# Patient Record
Sex: FEMALE | Race: BLACK OR AFRICAN AMERICAN | Employment: FULL TIME | URBAN - METROPOLITAN AREA
[De-identification: names, ages, dates, MRNs, and addresses within clinical notes are randomized per-mention and may not be internally consistent; named-entity substitution may affect disease eponyms.]

---

## 2022-06-30 ENCOUNTER — HOSPITAL ENCOUNTER (OUTPATIENT)
Dept: LAB | Age: 47
Discharge: HOME | End: 2022-06-30
Payer: COMMERCIAL

## 2022-06-30 LAB
FAX TO INFO,FAXT: NORMAL
FAX TO NUMBER,FAXN: NORMAL
HCT VFR BLD AUTO: 34.4 % (ref 35–45)
HGB BLD-MCNC: 11 G/DL (ref 12–16)

## 2022-06-30 PROCEDURE — 36415 COLL VENOUS BLD VENIPUNCTURE: CPT

## 2022-06-30 PROCEDURE — 85018 HEMOGLOBIN: CPT

## 2022-07-08 ENCOUNTER — HOSPITAL ENCOUNTER (OUTPATIENT)
Dept: LAB | Age: 47
Discharge: HOME | End: 2022-07-08
Payer: COMMERCIAL

## 2022-07-08 PROCEDURE — 88304 TISSUE EXAM BY PATHOLOGIST: CPT

## 2022-07-15 ENCOUNTER — TRANSCRIBE ORDER (OUTPATIENT)
Dept: SCHEDULING | Age: 47
End: 2022-07-15

## 2022-07-15 DIAGNOSIS — R10.84 GENERALIZED ABDOMINAL PAIN: Primary | ICD-10-CM

## 2022-07-19 ENCOUNTER — HOSPITAL ENCOUNTER (OUTPATIENT)
Dept: CT IMAGING | Age: 47
Discharge: HOME | End: 2022-07-19
Payer: COMMERCIAL

## 2022-07-19 DIAGNOSIS — R10.84 GENERALIZED ABDOMINAL PAIN: ICD-10-CM

## 2022-07-19 PROCEDURE — 74176 CT ABD & PELVIS W/O CONTRAST: CPT

## 2022-08-03 ENCOUNTER — HOSPITAL ENCOUNTER (EMERGENCY)
Age: 47
Discharge: HOME | End: 2022-08-03
Attending: STUDENT IN AN ORGANIZED HEALTH CARE EDUCATION/TRAINING PROGRAM
Payer: COMMERCIAL

## 2022-08-03 ENCOUNTER — APPOINTMENT (OUTPATIENT)
Dept: CT IMAGING | Age: 47
End: 2022-08-03
Attending: PHYSICIAN ASSISTANT
Payer: COMMERCIAL

## 2022-08-03 VITALS
OXYGEN SATURATION: 97 % | HEART RATE: 97 BPM | SYSTOLIC BLOOD PRESSURE: 128 MMHG | BODY MASS INDEX: 29.99 KG/M2 | WEIGHT: 180 LBS | TEMPERATURE: 97.6 F | RESPIRATION RATE: 16 BRPM | HEIGHT: 65 IN | DIASTOLIC BLOOD PRESSURE: 78 MMHG

## 2022-08-03 DIAGNOSIS — K59.00 CONSTIPATION, UNSPECIFIED CONSTIPATION TYPE: Primary | ICD-10-CM

## 2022-08-03 DIAGNOSIS — G89.18 POSTOPERATIVE PAIN: ICD-10-CM

## 2022-08-03 LAB
ALBUMIN SERPL-MCNC: 3.5 G/DL (ref 3.4–5)
ALBUMIN/GLOB SERPL: 1.1 {RATIO} (ref 0.8–1.7)
ALP SERPL-CCNC: 62 U/L (ref 45–117)
ALT SERPL-CCNC: 18 U/L (ref 13–56)
ANION GAP SERPL CALC-SCNC: 5 MMOL/L (ref 3–18)
APPEARANCE UR: CLEAR
AST SERPL-CCNC: 15 U/L (ref 10–38)
BASOPHILS # BLD: 0 K/UL (ref 0–0.1)
BASOPHILS NFR BLD: 1 % (ref 0–2)
BILIRUB SERPL-MCNC: 0.3 MG/DL (ref 0.2–1)
BILIRUB UR QL: NEGATIVE
BUN SERPL-MCNC: 9 MG/DL (ref 7–18)
BUN/CREAT SERPL: 10 (ref 12–20)
CALCIUM SERPL-MCNC: 9.2 MG/DL (ref 8.5–10.1)
CHLORIDE SERPL-SCNC: 109 MMOL/L (ref 100–111)
CO2 SERPL-SCNC: 29 MMOL/L (ref 21–32)
COLOR UR: YELLOW
CREAT SERPL-MCNC: 0.9 MG/DL (ref 0.6–1.3)
DIFFERENTIAL METHOD BLD: ABNORMAL
EOSINOPHIL # BLD: 0.1 K/UL (ref 0–0.4)
EOSINOPHIL NFR BLD: 2 % (ref 0–5)
ERYTHROCYTE [DISTWIDTH] IN BLOOD BY AUTOMATED COUNT: 12.4 % (ref 11.6–14.5)
GLOBULIN SER CALC-MCNC: 3.1 G/DL (ref 2–4)
GLUCOSE SERPL-MCNC: 119 MG/DL (ref 74–99)
GLUCOSE UR STRIP.AUTO-MCNC: NEGATIVE MG/DL
HCT VFR BLD AUTO: 30.4 % (ref 35–45)
HGB BLD-MCNC: 10 G/DL (ref 12–16)
HGB UR QL STRIP: NEGATIVE
IMM GRANULOCYTES # BLD AUTO: 0 K/UL (ref 0–0.04)
IMM GRANULOCYTES NFR BLD AUTO: 0 % (ref 0–0.5)
KETONES UR QL STRIP.AUTO: NEGATIVE MG/DL
LEUKOCYTE ESTERASE UR QL STRIP.AUTO: NEGATIVE
LIPASE SERPL-CCNC: 125 U/L (ref 73–393)
LYMPHOCYTES # BLD: 1.4 K/UL (ref 0.9–3.6)
LYMPHOCYTES NFR BLD: 26 % (ref 21–52)
MCH RBC QN AUTO: 30.6 PG (ref 24–34)
MCHC RBC AUTO-ENTMCNC: 32.9 G/DL (ref 31–37)
MCV RBC AUTO: 93 FL (ref 78–100)
MONOCYTES # BLD: 0.3 K/UL (ref 0.05–1.2)
MONOCYTES NFR BLD: 6 % (ref 3–10)
NEUTS SEG # BLD: 3.6 K/UL (ref 1.8–8)
NEUTS SEG NFR BLD: 67 % (ref 40–73)
NITRITE UR QL STRIP.AUTO: NEGATIVE
NRBC # BLD: 0 K/UL (ref 0–0.01)
NRBC BLD-RTO: 0 PER 100 WBC
PH UR STRIP: 5.5 [PH] (ref 5–8)
PLATELET # BLD AUTO: 321 K/UL (ref 135–420)
PMV BLD AUTO: 10.5 FL (ref 9.2–11.8)
POTASSIUM SERPL-SCNC: 4.8 MMOL/L (ref 3.5–5.5)
PROT SERPL-MCNC: 6.6 G/DL (ref 6.4–8.2)
PROT UR STRIP-MCNC: NEGATIVE MG/DL
RBC # BLD AUTO: 3.27 M/UL (ref 4.2–5.3)
SODIUM SERPL-SCNC: 143 MMOL/L (ref 136–145)
SP GR UR REFRACTOMETRY: 1.02 (ref 1–1.03)
UROBILINOGEN UR QL STRIP.AUTO: 0.2 EU/DL (ref 0.2–1)
WBC # BLD AUTO: 5.5 K/UL (ref 4.6–13.2)

## 2022-08-03 PROCEDURE — 74011250636 HC RX REV CODE- 250/636: Performed by: PHYSICIAN ASSISTANT

## 2022-08-03 PROCEDURE — 96374 THER/PROPH/DIAG INJ IV PUSH: CPT

## 2022-08-03 PROCEDURE — 81003 URINALYSIS AUTO W/O SCOPE: CPT

## 2022-08-03 PROCEDURE — 87086 URINE CULTURE/COLONY COUNT: CPT

## 2022-08-03 PROCEDURE — 85025 COMPLETE CBC W/AUTO DIFF WBC: CPT

## 2022-08-03 PROCEDURE — 80053 COMPREHEN METABOLIC PANEL: CPT

## 2022-08-03 PROCEDURE — 99284 EMERGENCY DEPT VISIT MOD MDM: CPT

## 2022-08-03 PROCEDURE — 74176 CT ABD & PELVIS W/O CONTRAST: CPT

## 2022-08-03 PROCEDURE — 83690 ASSAY OF LIPASE: CPT

## 2022-08-03 RX ORDER — MORPHINE SULFATE 4 MG/ML
4 INJECTION INTRAVENOUS ONCE
Status: COMPLETED | OUTPATIENT
Start: 2022-08-03 | End: 2022-08-03

## 2022-08-03 RX ADMIN — SODIUM CHLORIDE 1000 ML: 9 INJECTION, SOLUTION INTRAVENOUS at 11:28

## 2022-08-03 RX ADMIN — MORPHINE SULFATE 4 MG: 4 INJECTION INTRAVENOUS at 11:29

## 2022-08-03 NOTE — ED PROVIDER NOTES
EMERGENCY DEPARTMENT HISTORY & PHYSICAL EXAM    THE [facility] EMERGENCY DEPT  8/3/2022, 11:11 AM    Clinical Impression:  1. Constipation, unspecified constipation type    2. Postoperative pain        Assessment/Differential Diagnosis:     Ddx postsurgical complication, constipation, acute intra-abdominal pathology, infection, UTI, urinary retention all considered    ED Course:   Initial assessment performed. The patients presenting problems have been discussed, and they are in agreement with the care plan formulated and outlined with them. I have encouraged them to ask questions as they arise throughout their visit. Patient with hemorrhoidectomy done by Dr. Jacey Crockett on 12 July. Since that time she has had considerable abdominal pain in problems with constipation. She is in the ER today because she feels she is having burning with urination that started about 12 hours ago. No fever, trauma. Exam with considerable tenderness below the umbilicus both sides with no point tenderness. Vitals are within normal limits. Bladder scan with little urine after urination. Rectal exam with what appears to be well-healing area, tenderness throughout with no abscess appreciated. Skin appears healthy. Discussed case with Dr. Charlie Perez, on-call for Dr. Jacey Crockett, who agrees with blood work, urinalysis and CT scan without contrast given IV contrast shortage. Workup with CT with large stool burden, no other acute concerns  Discussed plan to d/c, need for several large bowel movements, discussed options, will try increasing miralax, and golytely  Pt to call Dr. Jacey Crockett tomorrow  Return to ED if new/worsening symptoms or new concerns       Medical Chart Review:  I have reviewed triage nursing documentation. Review of old medical records with the following pertinent information:       Disposition:  Home  in good condition.       Chief Complaint   Patient presents with    Urinary Pain    Constipation HPI:    The history is provided by patient. No  used. Jessica Blank is a 49 y.o. female presenting to the Emergency Department with complaints of dysuria. Patient had hemorrhoidectomy on 12 July with Dr. Sergei Duran. She states she has had considerable abdominal pain since then. She did have a CT on the 19th with no acute findings other than significant stool burden. She has been using MiraLAX. Sobeida Marlene She states she is having a small amount of stool daily. She states she is having significant pain with bowel movement but that has not changed since surgery. She has noticed over the last 12 to 24 hours that she has pain with urination which is new. She feels she is not emptying her bladder completely. For that reason she comes to the ER for evaluation. There is been no fever, chills or flulike illness. No shortness of breath, chest pain. Slight nausea but no vomiting. She states she is eating and drinking well. No other concerns      I have reviewed all PMHX, FMHX and Social Hx as entered into the medical record in the chart below using the Epic Template. Review of Systems:  Constitutional:  Neg for fever,  chills, weight changes. ENT:  Neg for Sore throat, runny nose, or other URI symptoms  Respiratory:  neg for cough, no shortness of breath, or wheezing  Cardiovascular:  No chest pain, chest pressure, palpitations. GI:  no vomiting, nodiarrhea, + abdominal pain. : + dysuria, no frequency, +urgency. No Flank pain. MSK no joint pain, no myalgias  Integumentary: no rashes, lesions, or skin irritation. Neurological: no headaches, dizziness  All other systems reviewed negative except was is positive in ROS and HPI.     Past Medical History:  Past Medical History:   Diagnosis Date    Anemia     Pelvic mass        Past Surgical History:  Past Surgical History:   Procedure Laterality Date    HX HEMORRHOIDECTOMY      HX HYSTERECTOMY  01/01/2011       Family History:  History reviewed. No pertinent family history. Social History:  Social History     Tobacco Use    Smoking status: Never   Substance Use Topics    Alcohol use: No    Drug use: Never       Allergies: Allergies   Allergen Reactions    Adhesive Rash     Electrodes causes redness, burn appearence    Nyquil [Doxylamin-Pse-Dm-Acetaminophen] Palpitations    Vicodin [Hydrocodone-Acetaminophen] Other (comments)     hallucinations       Vital Signs:  Vitals:    08/03/22 1028   BP: 128/78   Pulse: 97   Resp: 16   Temp: 97.6 °F (36.4 °C)   SpO2: 97%   Weight: 81.6 kg (180 lb)   Height: 5' 5\" (1.651 m)     Physical Exam:  Vital Signs Reviewed. Nursing Notes Reviewed. Constitutional:  Well developed, well nourished patient. Appearance and behavior are age and situation appropriate. Head: Normocephalic, Atraumatic  Eyes: Conjunctiva clear, lids normal. Sclera anicteric. PERRL.  ENT:  gross hearing normal  Neck:  Supple, FROM. Trachea midline. No nuchal rigidity. Lungs: Lungs CTAB. No wheezes, rales or rhonchi. No respiratory distress, tachypnea or accessory muscle use. No cough. Cardiac:  RRR without murmur. No peripheral edema  Abdomen: soft, +tender LLQ and RLQ, no rebound, or guarding, normal  bowel sounds, no mass. Skin without rash or signs of trauma. Rectal: pain with external examination,  buttock. No obvious cellulitis, or abscess. Extremities:  no pedal edema  Neuro:  A&O , no obvious neuro deficit with general inspection.   Skin:  Warm, dry, no rash  Back:  No CVAT    Diagnostics:    Labs -     Recent Results (from the past 12 hour(s))   CBC WITH AUTOMATED DIFF    Collection Time: 08/03/22 11:15 AM   Result Value Ref Range    WBC 5.5 4.6 - 13.2 K/uL    RBC 3.27 (L) 4.20 - 5.30 M/uL    HGB 10.0 (L) 12.0 - 16.0 g/dL    HCT 30.4 (L) 35.0 - 45.0 %    MCV 93.0 78.0 - 100.0 FL    MCH 30.6 24.0 - 34.0 PG    MCHC 32.9 31.0 - 37.0 g/dL    RDW 12.4 11.6 - 14.5 %    PLATELET 252 706 - 050 K/uL    MPV 10.5 9.2 - 11.8 FL NRBC 0.0 0  WBC    ABSOLUTE NRBC 0.00 0.00 - 0.01 K/uL    NEUTROPHILS 67 40 - 73 %    LYMPHOCYTES 26 21 - 52 %    MONOCYTES 6 3 - 10 %    EOSINOPHILS 2 0 - 5 %    BASOPHILS 1 0 - 2 %    IMMATURE GRANULOCYTES 0 0.0 - 0.5 %    ABS. NEUTROPHILS 3.6 1.8 - 8.0 K/UL    ABS. LYMPHOCYTES 1.4 0.9 - 3.6 K/UL    ABS. MONOCYTES 0.3 0.05 - 1.2 K/UL    ABS. EOSINOPHILS 0.1 0.0 - 0.4 K/UL    ABS. BASOPHILS 0.0 0.0 - 0.1 K/UL    ABS. IMM. GRANS. 0.0 0.00 - 0.04 K/UL    DF AUTOMATED     METABOLIC PANEL, COMPREHENSIVE    Collection Time: 08/03/22 11:15 AM   Result Value Ref Range    Sodium 143 136 - 145 mmol/L    Potassium 4.8 3.5 - 5.5 mmol/L    Chloride 109 100 - 111 mmol/L    CO2 29 21 - 32 mmol/L    Anion gap 5 3.0 - 18 mmol/L    Glucose 119 (H) 74 - 99 mg/dL    BUN 9 7.0 - 18 MG/DL    Creatinine 0.90 0.6 - 1.3 MG/DL    BUN/Creatinine ratio 10 (L) 12 - 20      GFR est AA >60 >60 ml/min/1.73m2    GFR est non-AA >60 >60 ml/min/1.73m2    Calcium 9.2 8.5 - 10.1 MG/DL    Bilirubin, total 0.3 0.2 - 1.0 MG/DL    ALT (SGPT) 18 13 - 56 U/L    AST (SGOT) 15 10 - 38 U/L    Alk. phosphatase 62 45 - 117 U/L    Protein, total 6.6 6.4 - 8.2 g/dL    Albumin 3.5 3.4 - 5.0 g/dL    Globulin 3.1 2.0 - 4.0 g/dL    A-G Ratio 1.1 0.8 - 1.7     LIPASE    Collection Time: 08/03/22 11:15 AM   Result Value Ref Range    Lipase 125 73 - 393 U/L   URINALYSIS W/ RFLX MICROSCOPIC    Collection Time: 08/03/22 11:15 AM   Result Value Ref Range    Color YELLOW      Appearance CLEAR      Specific gravity 1.017 1.005 - 1.030      pH (UA) 5.5 5.0 - 8.0      Protein Negative NEG mg/dL    Glucose Negative NEG mg/dL    Ketone Negative NEG mg/dL    Bilirubin Negative NEG      Blood Negative NEG      Urobilinogen 0.2 0.2 - 1.0 EU/dL    Nitrites Negative NEG      Leukocyte Esterase Negative NEG         Radiologic Studies -   CT ABD PELV WO CONT   Final Result      No acute intra-abdominal abnormality. Large amount stool throughout the colon.            CT Results  (Last 48 hours)                 08/03/22 1250  CT ABD PELV WO CONT Final result    Impression:      No acute intra-abdominal abnormality. Large amount stool throughout the colon. Narrative:  EXAM: CT of the abdomen and pelvis       INDICATION: Pain. COMPARISON: 7/19/2022       TECHNIQUE: Axial CT imaging of the abdomen and pelvis was performed without   intravenous contrast. Multiplanar reformats were generated. One or more dose reduction techniques were used on this CT: automated exposure   control, adjustment of the mAs and/or kVp according to patient size, and   iterative reconstruction techniques. The specific techniques used on this CT   exam have been documented in the patient's electronic medical record. Digital   Imaging and Communications in Medicine (DICOM) format image data are available   to nonaffiliated external healthcare facilities or entities on a secure, media   free, reciprocally searchable basis with patient authorization for at least a   12-month period after this study. _______________       FINDINGS:       LOWER CHEST: Unremarkable. LIVER, BILIARY: Liver is normal. No biliary dilation. Gallbladder is   unremarkable. PANCREAS: Normal.       SPLEEN: Normal.       ADRENALS: Normal.       KIDNEYS/URETERS/BLADDER: No hydronephrosis or renal calcification. PELVIC ORGANS: Prior AMOS/BSO.       VASCULATURE: Unremarkable       LYMPH NODES: No enlarged lymph nodes. GASTROINTESTINAL TRACT: No bowel dilation or wall thickening. Rectal sigmoid   anastomotic suture with adjacent postsurgical changes. Large amount stool   throughout the colon. BONES: No acute or aggressive osseous abnormalities identified.        OTHER: None.       _______________                 CXR Results  (Last 48 hours)      None            Medications given in the ED-  Medications   morphine injection 4 mg (4 mg IntraVENous Given 8/3/22 7593)   sodium chloride 0.9 % bolus infusion 1,000 mL (1,000 mL IntraVENous New Bag 8/3/22 6348)       Please note that this dictation was completed with [employer], the computer voice recognition software. Quite often unanticipated grammatical, syntax, homophones, and other interpretive errors are inadvertently transcribed by the computer software. Please disregard these errors. Please excuse any errors that have escaped final proofreading.

## 2022-08-03 NOTE — DISCHARGE INSTRUCTIONS
Well-hydrated  Healthy diet  Continue all your home medications  Avoid Percocet as it will worsen your constipation  MiraLAX, take 1 capful 3 times a day  May add stool softener  Can try Dulcolax  GoLytely as prescribed  Your CT shows a large amount of stool. He will need to have several large bowel movements for clearance.   Follow-up with Dr. Virgen Goncalves via phone call tomorrow  Return to ER if any new or worsening symptoms or new concerns

## 2022-08-03 NOTE — ED TRIAGE NOTES
Patient reports she had hemorroidectomy about 4 weeks ago now she is having trouble urinating and constipated ( last bm was yesterday)

## 2022-08-04 LAB
BACTERIA SPEC CULT: NORMAL
SERVICE CMNT-IMP: NORMAL

## 2022-08-05 ENCOUNTER — HOSPITAL ENCOUNTER (EMERGENCY)
Age: 47
Discharge: HOME | End: 2022-08-05
Attending: STUDENT IN AN ORGANIZED HEALTH CARE EDUCATION/TRAINING PROGRAM
Payer: COMMERCIAL

## 2022-08-05 VITALS
SYSTOLIC BLOOD PRESSURE: 127 MMHG | RESPIRATION RATE: 18 BRPM | HEART RATE: 86 BPM | OXYGEN SATURATION: 100 % | TEMPERATURE: 98 F | BODY MASS INDEX: 29.99 KG/M2 | DIASTOLIC BLOOD PRESSURE: 76 MMHG | WEIGHT: 180 LBS | HEIGHT: 65 IN

## 2022-08-05 DIAGNOSIS — Z98.890 HX OF HEMORRHOIDECTOMY: ICD-10-CM

## 2022-08-05 DIAGNOSIS — K62.89 RECTAL PAIN: Primary | ICD-10-CM

## 2022-08-05 LAB
ALBUMIN SERPL-MCNC: 3.4 G/DL (ref 3.4–5)
ALBUMIN/GLOB SERPL: 1.1 {RATIO} (ref 0.8–1.7)
ALP SERPL-CCNC: 53 U/L (ref 45–117)
ALT SERPL-CCNC: 19 U/L (ref 13–56)
ANION GAP SERPL CALC-SCNC: 4 MMOL/L (ref 3–18)
AST SERPL-CCNC: 14 U/L (ref 10–38)
BASOPHILS # BLD: 0 K/UL (ref 0–0.1)
BASOPHILS NFR BLD: 1 % (ref 0–2)
BILIRUB SERPL-MCNC: 0.5 MG/DL (ref 0.2–1)
BUN SERPL-MCNC: 6 MG/DL (ref 7–18)
BUN/CREAT SERPL: 7 (ref 12–20)
CALCIUM SERPL-MCNC: 9 MG/DL (ref 8.5–10.1)
CHLORIDE SERPL-SCNC: 107 MMOL/L (ref 100–111)
CO2 SERPL-SCNC: 30 MMOL/L (ref 21–32)
CREAT SERPL-MCNC: 0.84 MG/DL (ref 0.6–1.3)
DIFFERENTIAL METHOD BLD: ABNORMAL
EOSINOPHIL # BLD: 0.1 K/UL (ref 0–0.4)
EOSINOPHIL NFR BLD: 2 % (ref 0–5)
ERYTHROCYTE [DISTWIDTH] IN BLOOD BY AUTOMATED COUNT: 12.5 % (ref 11.6–14.5)
GLOBULIN SER CALC-MCNC: 3.1 G/DL (ref 2–4)
GLUCOSE SERPL-MCNC: 89 MG/DL (ref 74–99)
HCT VFR BLD AUTO: 28.3 % (ref 35–45)
HGB BLD-MCNC: 9.3 G/DL (ref 12–16)
IMM GRANULOCYTES # BLD AUTO: 0 K/UL (ref 0–0.04)
IMM GRANULOCYTES NFR BLD AUTO: 1 % (ref 0–0.5)
INR PPP: 1 (ref 0.8–1.2)
LYMPHOCYTES # BLD: 1.3 K/UL (ref 0.9–3.6)
LYMPHOCYTES NFR BLD: 30 % (ref 21–52)
MCH RBC QN AUTO: 30.3 PG (ref 24–34)
MCHC RBC AUTO-ENTMCNC: 32.9 G/DL (ref 31–37)
MCV RBC AUTO: 92.2 FL (ref 78–100)
MONOCYTES # BLD: 0.3 K/UL (ref 0.05–1.2)
MONOCYTES NFR BLD: 8 % (ref 3–10)
NEUTS SEG # BLD: 2.6 K/UL (ref 1.8–8)
NEUTS SEG NFR BLD: 60 % (ref 40–73)
NRBC # BLD: 0 K/UL (ref 0–0.01)
NRBC BLD-RTO: 0 PER 100 WBC
PLATELET # BLD AUTO: 316 K/UL (ref 135–420)
PMV BLD AUTO: 10.6 FL (ref 9.2–11.8)
POTASSIUM SERPL-SCNC: 3.7 MMOL/L (ref 3.5–5.5)
PROT SERPL-MCNC: 6.5 G/DL (ref 6.4–8.2)
PROTHROMBIN TIME: 13.4 SEC (ref 11.5–15.2)
RBC # BLD AUTO: 3.07 M/UL (ref 4.2–5.3)
SODIUM SERPL-SCNC: 141 MMOL/L (ref 136–145)
WBC # BLD AUTO: 4.3 K/UL (ref 4.6–13.2)

## 2022-08-05 PROCEDURE — 74011250636 HC RX REV CODE- 250/636: Performed by: STUDENT IN AN ORGANIZED HEALTH CARE EDUCATION/TRAINING PROGRAM

## 2022-08-05 PROCEDURE — 99284 EMERGENCY DEPT VISIT MOD MDM: CPT

## 2022-08-05 PROCEDURE — 80053 COMPREHEN METABOLIC PANEL: CPT

## 2022-08-05 PROCEDURE — 96375 TX/PRO/DX INJ NEW DRUG ADDON: CPT

## 2022-08-05 PROCEDURE — 85025 COMPLETE CBC W/AUTO DIFF WBC: CPT

## 2022-08-05 PROCEDURE — 85610 PROTHROMBIN TIME: CPT

## 2022-08-05 PROCEDURE — 96374 THER/PROPH/DIAG INJ IV PUSH: CPT

## 2022-08-05 RX ORDER — DIAZEPAM 5 MG/1
5 TABLET ORAL
Qty: 12 TABLET | Refills: 0 | Status: SHIPPED | OUTPATIENT
Start: 2022-08-05

## 2022-08-05 RX ORDER — MORPHINE SULFATE 4 MG/ML
4 INJECTION INTRAVENOUS ONCE
Status: COMPLETED | OUTPATIENT
Start: 2022-08-05 | End: 2022-08-05

## 2022-08-05 RX ORDER — ONDANSETRON 2 MG/ML
4 INJECTION INTRAMUSCULAR; INTRAVENOUS ONCE
Status: COMPLETED | OUTPATIENT
Start: 2022-08-05 | End: 2022-08-05

## 2022-08-05 RX ADMIN — SODIUM CHLORIDE 1000 ML: 9 INJECTION, SOLUTION INTRAVENOUS at 07:30

## 2022-08-05 RX ADMIN — ONDANSETRON 4 MG: 2 INJECTION INTRAMUSCULAR; INTRAVENOUS at 07:30

## 2022-08-05 RX ADMIN — MORPHINE SULFATE 4 MG: 4 INJECTION INTRAVENOUS at 07:30

## 2022-08-05 NOTE — DISCHARGE INSTRUCTIONS
Go to pharmacy to  your Valium, while you are there you should buy MiraLAX which you should need to take 3 times daily as well as bisacodyl to help manage your stools until you see Dr. Earney Severs. Use the Valium as needed for your pain, you can continue to take Motrin and Tylenol    Return to the emergency department as needed.

## 2022-08-05 NOTE — ED TRIAGE NOTES
Pt arrived with c/o continued postoperative pain and bleeding. Pt had a hemorrhoidectomy 4 weeks ago. Pt was seen here for abdominal and rectal pain 2 days ago and was found to be constipated. Pt has noted increase of blood in her stool and issues with her suture. Pt is squirming and in visible pain. Pt is alert and oriented x4. Vital signs are stable.

## 2022-08-05 NOTE — ED PROVIDER NOTES
EMERGENCY DEPARTMENT HISTORY AND PHYSICAL EXAM    7:13 AM    Date: 8/5/2022  Patient Name: Luis Coates    History of Presenting Illness     Chief Complaint   Patient presents with    Post OP Complication       History Provided By: Patient  Location/Duration/Severity/Modifying factors   HPI  Luis Coates is a 49 y.o. female status post hemorrhoidectomy on 7/12/2022 with Dr. July Husain presenting for anal pain. She said that she has been dealing with some constipation, has been taking GoLytely for it. She was having some pain in her rectal area last night, planned on seeing her surgeon today in the office however at 4 AM tonight when attempting to have a bowel movement she thinks one of her sutures got displaced and she started having rectal bleeding. She said that she felt lightheaded but did not have any syncopal episodes. She is not on any anticoagulation, no history of easy bleeding. She does complain of some anterior abdominal pain and says that it feels like she needs to urinate. She denies any dysuria. No fevers. She has slight nausea but no vomiting. Eating and drinking well. Had a bowel movement upon arrival to the emergency department, did notice some blood in it and pain when wiping. No other alleviating or aggravating factors. She is been taking Motrin and Tylenol scheduled. PCP: Ivette Delatorre, DO    Current Outpatient Medications   Medication Sig Dispense Refill    diazePAM (Valium) 5 mg tablet Take 1 Tablet by mouth every eight (8) hours as needed for Muscle Spasm(s). Max Daily Amount: 15 mg. 12 Tablet 0    estradiol (ESTRACE) 0.01 % (0.1 mg/gram) vaginal cream Insert 1 g into vagina daily. 42.5 g 6    lidocaine (XYLOCAINE) 5 % ointment Apply  to affected area as needed for Pain. 50 g 6    lidocaine (XYLOCAINE) 5 % ointment Apply  to affected area as needed for Pain. 50 g 0    famotidine (PEPCID) 20 mg tablet Take 20 mg by mouth two (2) times a day.       loratadine (CLARITIN) 10 mg tablet Take 10 mg by mouth daily. Cetirizine (ZYRTEC) 10 mg cap Take  by mouth.      calcium-vitamin D (OYSTER SHELL) 500 mg(1,250mg) -200 unit per tablet Take 1 Tab by mouth two (2) times daily (with meals). docusate sodium (COLACE) 100 mg capsule Take 100 mg by mouth two (2) times a day. multivitamin (ONE A DAY) tablet Take 1 Tab by mouth daily. lubiPROStone (AMITIZA) 24 mcg capsule Take  by mouth. cyclobenzaprine (FLEXERIL) 10 mg tablet Take 1 Tab by mouth nightly. 30 Tab 3       Past History     Past Medical History:  Past Medical History:   Diagnosis Date    Anemia     Pelvic mass        Past Surgical History:  Past Surgical History:   Procedure Laterality Date    HX HEMORRHOIDECTOMY      HX HYSTERECTOMY  01/01/2011       Family History:  No family history on file. Social History:  Social History     Tobacco Use    Smoking status: Never   Substance Use Topics    Alcohol use: No    Drug use: Never       Allergies: Allergies   Allergen Reactions    Adhesive Rash     Electrodes causes redness, burn appearence    Nyquil [Doxylamin-Pse-Dm-Acetaminophen] Palpitations    Vicodin [Hydrocodone-Acetaminophen] Other (comments)     hallucinations       I reviewed and confirmed the above information with patient and updated as necessary. Review of Systems     Review of Systems   Constitutional:  Negative for diaphoresis and fever. HENT:  Negative for ear pain and sore throat. Eyes:         No acute change in vision   Respiratory:  Negative for cough and shortness of breath. Cardiovascular:  Negative for chest pain and leg swelling. Gastrointestinal:  Positive for abdominal pain, anal bleeding, blood in stool and constipation. Negative for vomiting. Genitourinary:  Negative for dysuria. Musculoskeletal:  Negative for neck pain. Skin:  Negative for wound. Neurological:  Negative for weakness and headaches.      Physical Exam   Visit Vitals  /77   Pulse 84   Temp 98 °F (36.7 °C)   Resp 18   Ht 5' 5\" (1.651 m)   Wt 81.6 kg (180 lb)   SpO2 100%   BMI 29.95 kg/m²       Physical Exam  Vitals and nursing note reviewed. Constitutional:       Comments: Adult female resting in a hospital stretcher, nondistressed   HENT:      Mouth/Throat:      Mouth: Mucous membranes are moist.   Eyes:      Pupils: Pupils are equal, round, and reactive to light. Cardiovascular:      Rate and Rhythm: Normal rate and regular rhythm. Pulses: Normal pulses. Pulmonary:      Effort: Pulmonary effort is normal.      Breath sounds: Normal breath sounds. Abdominal:      Palpations: Abdomen is soft. Tenderness: There is abdominal tenderness (Minimal suprapubic tenderness). Genitourinary:     Comments: Tenderness just with visualization by removing the gluteus out of the way, appears to be some oozing of blood from the rectum site, suture material approximately 4 cm long dangling from the incision site. Potentially a small area where the wound is opening. Musculoskeletal:         General: No signs of injury. Normal range of motion. Cervical back: Normal range of motion. Skin:     General: Skin is warm. Neurological:      General: No focal deficit present. Mental Status: She is alert and oriented to person, place, and time. Mental status is at baseline.        Diagnostic Study Results     Labs -  Recent Results (from the past 24 hour(s))   CBC WITH AUTOMATED DIFF    Collection Time: 08/05/22  7:15 AM   Result Value Ref Range    WBC 4.3 (L) 4.6 - 13.2 K/uL    RBC 3.07 (L) 4.20 - 5.30 M/uL    HGB 9.3 (L) 12.0 - 16.0 g/dL    HCT 28.3 (L) 35.0 - 45.0 %    MCV 92.2 78.0 - 100.0 FL    MCH 30.3 24.0 - 34.0 PG    MCHC 32.9 31.0 - 37.0 g/dL    RDW 12.5 11.6 - 14.5 %    PLATELET 498 177 - 191 K/uL    MPV 10.6 9.2 - 11.8 FL    NRBC 0.0 0  WBC    ABSOLUTE NRBC 0.00 0.00 - 0.01 K/uL    NEUTROPHILS 60 40 - 73 %    LYMPHOCYTES 30 21 - 52 %    MONOCYTES 8 3 - 10 %    EOSINOPHILS 2 0 - 5 % BASOPHILS 1 0 - 2 %    IMMATURE GRANULOCYTES 1 (H) 0.0 - 0.5 %    ABS. NEUTROPHILS 2.6 1.8 - 8.0 K/UL    ABS. LYMPHOCYTES 1.3 0.9 - 3.6 K/UL    ABS. MONOCYTES 0.3 0.05 - 1.2 K/UL    ABS. EOSINOPHILS 0.1 0.0 - 0.4 K/UL    ABS. BASOPHILS 0.0 0.0 - 0.1 K/UL    ABS. IMM. GRANS. 0.0 0.00 - 0.04 K/UL    DF AUTOMATED     METABOLIC PANEL, COMPREHENSIVE    Collection Time: 08/05/22  7:15 AM   Result Value Ref Range    Sodium 141 136 - 145 mmol/L    Potassium 3.7 3.5 - 5.5 mmol/L    Chloride 107 100 - 111 mmol/L    CO2 30 21 - 32 mmol/L    Anion gap 4 3.0 - 18 mmol/L    Glucose 89 74 - 99 mg/dL    BUN 6 (L) 7.0 - 18 MG/DL    Creatinine 0.84 0.6 - 1.3 MG/DL    BUN/Creatinine ratio 7 (L) 12 - 20      GFR est AA >60 >60 ml/min/1.73m2    GFR est non-AA >60 >60 ml/min/1.73m2    Calcium 9.0 8.5 - 10.1 MG/DL    Bilirubin, total 0.5 0.2 - 1.0 MG/DL    ALT (SGPT) 19 13 - 56 U/L    AST (SGOT) 14 10 - 38 U/L    Alk. phosphatase 53 45 - 117 U/L    Protein, total 6.5 6.4 - 8.2 g/dL    Albumin 3.4 3.4 - 5.0 g/dL    Globulin 3.1 2.0 - 4.0 g/dL    A-G Ratio 1.1 0.8 - 1.7     PROTHROMBIN TIME + INR    Collection Time: 08/05/22  7:15 AM   Result Value Ref Range    Prothrombin time 13.4 11.5 - 15.2 sec    INR 1.0 0.8 - 1.2           Radiologic Studies -   No orders to display           Medical Decision Making   I am the first provider for this patient. I reviewed the vital signs, available nursing notes, past medical history, past surgical history, family history and social history. Vital Signs-Reviewed the patient's vital signs.     Records Reviewed: Nursing Notes and Old Medical Records (Time of Review: 7:13 AM)    Provider Notes (Medical Decision Making):   MDM  41-year-old female here with rectal bleeding consider constipation, postoperative pain, suture dislodgment or wound dehiscence      ED Course: Progress Notes, Reevaluation, and Consults:  Patient is tachycardic but otherwise hemodynamically normal  Rest comfortably, does not appear to be in distress  Abdomen is soft, minimally tender in the suprapubic region, no rebound or guarding    Will screen labs, urine, treat with morphine and Zofran, reviewed recent CT scan from 2 days ago, do not feel that repeat imaging is indicated at this time. We will plan to discuss with general surgery. CBC with mild anemia, today 9.3 down from 10.0 on 8/3  CMP unremarkable    9:00 AM  Discussed with on-call surgeon, Dr. Lord Yancey, he has discussed the case with Dr. Lane Mcgee and she will be down to evaluate the patient. 11:06 AM  Patient was evaluated by Dr. Lane Mcgee, she removed the sutures, said that this is the normal postoperative course, discussed the care plan with the patient. She will take MiraLAX and bisacodyl as well as Valium for her symptoms, will follow-up in the office. Patient was discharged home in stable condition. Procedures    Diagnosis     Clinical Impression:   1. Rectal pain    2. Hx of hemorrhoidectomy        Disposition: Home    Follow-up Information    None          Patient's Medications   Start Taking    DIAZEPAM (VALIUM) 5 MG TABLET    Take 1 Tablet by mouth every eight (8) hours as needed for Muscle Spasm(s). Max Daily Amount: 15 mg. Continue Taking    CALCIUM-VITAMIN D (OYSTER SHELL) 500 MG(1,250MG) -200 UNIT PER TABLET    Take 1 Tab by mouth two (2) times daily (with meals). CETIRIZINE (ZYRTEC) 10 MG CAP    Take  by mouth. CYCLOBENZAPRINE (FLEXERIL) 10 MG TABLET    Take 1 Tab by mouth nightly. DOCUSATE SODIUM (COLACE) 100 MG CAPSULE    Take 100 mg by mouth two (2) times a day. ESTRADIOL (ESTRACE) 0.01 % (0.1 MG/GRAM) VAGINAL CREAM    Insert 1 g into vagina daily. FAMOTIDINE (PEPCID) 20 MG TABLET    Take 20 mg by mouth two (2) times a day. LIDOCAINE (XYLOCAINE) 5 % OINTMENT    Apply  to affected area as needed for Pain. LIDOCAINE (XYLOCAINE) 5 % OINTMENT    Apply  to affected area as needed for Pain.     LORATADINE (CLARITIN) 10 MG TABLET Take 10 mg by mouth daily. LUBIPROSTONE (AMITIZA) 24 MCG CAPSULE    Take  by mouth. MULTIVITAMIN (ONE A DAY) TABLET    Take 1 Tab by mouth daily. These Medications have changed    No medications on file   Stop Taking    No medications on file       Jamel Salamanca MD   Emergency Medicine   August 5, 2022, 7:13 AM     This note is dictated utilizing Dragon voice recognition software. Unfortunately this leads to occasional typographical errors using the voice recognition. I apologize in advance if the situation occurs. If questions occur please do not hesitate to contact me directly.     Sue Baumann MD